# Patient Record
Sex: MALE | Race: WHITE | Employment: STUDENT | URBAN - METROPOLITAN AREA
[De-identification: names, ages, dates, MRNs, and addresses within clinical notes are randomized per-mention and may not be internally consistent; named-entity substitution may affect disease eponyms.]

---

## 2017-01-25 ENCOUNTER — OFFICE VISIT (OUTPATIENT)
Dept: INTERNAL MEDICINE CLINIC | Facility: CLINIC | Age: 22
End: 2017-01-25

## 2017-01-25 ENCOUNTER — APPOINTMENT (OUTPATIENT)
Dept: LAB | Age: 22
End: 2017-01-25
Attending: NURSE PRACTITIONER
Payer: COMMERCIAL

## 2017-01-25 VITALS
HEART RATE: 76 BPM | SYSTOLIC BLOOD PRESSURE: 120 MMHG | BODY MASS INDEX: 22.64 KG/M2 | WEIGHT: 169 LBS | DIASTOLIC BLOOD PRESSURE: 62 MMHG | TEMPERATURE: 99 F | HEIGHT: 72.5 IN

## 2017-01-25 DIAGNOSIS — Z11.3 SCREEN FOR STD (SEXUALLY TRANSMITTED DISEASE): ICD-10-CM

## 2017-01-25 DIAGNOSIS — Z00.00 ROUTINE GENERAL MEDICAL EXAMINATION AT A HEALTH CARE FACILITY: ICD-10-CM

## 2017-01-25 DIAGNOSIS — Z00.00 ROUTINE GENERAL MEDICAL EXAMINATION AT A HEALTH CARE FACILITY: Primary | ICD-10-CM

## 2017-01-25 DIAGNOSIS — Z13.220 SCREENING FOR LIPID DISORDERS: ICD-10-CM

## 2017-01-25 DIAGNOSIS — Z13.29 SCREENING FOR ENDOCRINE, METABOLIC AND IMMUNITY DISORDER: ICD-10-CM

## 2017-01-25 DIAGNOSIS — Z13.0 SCREENING FOR ENDOCRINE, METABOLIC AND IMMUNITY DISORDER: ICD-10-CM

## 2017-01-25 DIAGNOSIS — Z13.228 SCREENING FOR ENDOCRINE, METABOLIC AND IMMUNITY DISORDER: ICD-10-CM

## 2017-01-25 DIAGNOSIS — Z00.00 LABORATORY EXAMINATION ORDERED AS PART OF A COMPLETE PHYSICAL EXAMINATION: ICD-10-CM

## 2017-01-25 DIAGNOSIS — Z13.29 SCREENING FOR THYROID DISORDER: ICD-10-CM

## 2017-01-25 LAB
ALBUMIN SERPL-MCNC: 4.1 G/DL (ref 3.5–4.8)
ALP LIVER SERPL-CCNC: 67 U/L (ref 45–117)
ALT SERPL-CCNC: 31 U/L (ref 17–63)
AST SERPL-CCNC: 17 U/L (ref 15–41)
BILIRUB SERPL-MCNC: 0.3 MG/DL (ref 0.1–2)
BUN BLD-MCNC: 16 MG/DL (ref 8–20)
CALCIUM BLD-MCNC: 9.2 MG/DL (ref 8.3–10.3)
CHLORIDE: 106 MMOL/L (ref 101–111)
CHOLEST SMN-MCNC: 117 MG/DL (ref ?–190)
CO2: 29 MMOL/L (ref 22–32)
CREAT BLD-MCNC: 0.85 MG/DL (ref 0.7–1.3)
GLUCOSE BLD-MCNC: 87 MG/DL (ref 70–99)
HDLC SERPL-MCNC: 72 MG/DL (ref 45–?)
HDLC SERPL: 1.63 {RATIO} (ref ?–4.97)
LDLC SERPL CALC-MCNC: 35 MG/DL (ref ?–120)
M PROTEIN MFR SERPL ELPH: 7.4 G/DL (ref 6.1–8.3)
NONHDLC SERPL-MCNC: 45 MG/DL (ref ?–150)
POTASSIUM SERPL-SCNC: 4.2 MMOL/L (ref 3.6–5.1)
SODIUM SERPL-SCNC: 141 MMOL/L (ref 136–144)
TRIGLYCERIDES: 51 MG/DL (ref ?–115)
TSI SER-ACNC: 1.34 MIU/ML (ref 0.35–5.5)
VLDL: 10 MG/DL (ref 5–40)

## 2017-01-25 PROCEDURE — 84443 ASSAY THYROID STIM HORMONE: CPT

## 2017-01-25 PROCEDURE — 90715 TDAP VACCINE 7 YRS/> IM: CPT | Performed by: NURSE PRACTITIONER

## 2017-01-25 PROCEDURE — 80061 LIPID PANEL: CPT

## 2017-01-25 PROCEDURE — 99385 PREV VISIT NEW AGE 18-39: CPT | Performed by: NURSE PRACTITIONER

## 2017-01-25 PROCEDURE — 80053 COMPREHEN METABOLIC PANEL: CPT

## 2017-01-25 PROCEDURE — 90471 IMMUNIZATION ADMIN: CPT | Performed by: NURSE PRACTITIONER

## 2017-01-25 NOTE — PROGRESS NOTES
Laureano Chew is a 24year old male who presents for a complete physical exam.     HPI:   Pt complains of  None. 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 Migraine history. Has been quite some time since he had a migraine.   Treated with imitrex i 03/09/1995 05/08/1995 07/25/1995 02/07/2000      MMR                   04/16/1996 08/31/1999      Meningococcal, Conjugate                          04/26/2008 08/03/2013      TDAP                  04/26/2008 01/25/2017    D diagnosis)  Laboratory examination ordered as part of a complete physical examination  Screen for std (sexually transmitted disease)      Orders Placed This Encounter  TSH  COMP METABOLIC PANEL  LIPID PANEL  TdaP (Adacel, Boostrix) (59546) (DX V06.1/Z23)

## 2017-01-26 LAB
C TRACH DNA SPEC QL NAA+PROBE: NEGATIVE
N GONORRHOEA DNA SPEC QL NAA+PROBE: NEGATIVE

## 2024-07-18 ENCOUNTER — OFFICE VISIT (OUTPATIENT)
Dept: NEUROLOGY | Facility: CLINIC | Age: 29
End: 2024-07-18
Payer: COMMERCIAL

## 2024-07-18 VITALS
OXYGEN SATURATION: 98 % | DIASTOLIC BLOOD PRESSURE: 68 MMHG | BODY MASS INDEX: 25 KG/M2 | HEART RATE: 68 BPM | RESPIRATION RATE: 16 BRPM | WEIGHT: 190.19 LBS | SYSTOLIC BLOOD PRESSURE: 122 MMHG

## 2024-07-18 DIAGNOSIS — R41.89 BRAIN FOG: ICD-10-CM

## 2024-07-18 DIAGNOSIS — G43.109 MIGRAINE WITH AURA AND WITHOUT STATUS MIGRAINOSUS, NOT INTRACTABLE: Primary | ICD-10-CM

## 2024-07-18 PROCEDURE — 99204 OFFICE O/P NEW MOD 45 MIN: CPT | Performed by: OTHER

## 2024-07-18 NOTE — PROGRESS NOTES
Patient states he was getting migraines since the beginning of this year and noticed some trouble focusing/ concentrating for the past 1 month. Some forgetfulness also. No headache association. Last headache was 2 weeks ago but mild.

## 2024-07-18 NOTE — PROGRESS NOTES
HPI:    Patient ID: Abdoulaye Hayden is a 28 year old male.    HPI  Abdoulaye Hayden is is a 28-year-old male with history of migraines who presented for evaluation of headaches and and other new neurological symptoms.  He was seen by me about 10 years ago for migraines and at that time patient was advised to take propranolol, he was unable to tolerate so patient discontinued it. He reports migraine Improved on its own and he continue to manage it with over-the-counter medications  He reports he usually gets couple migraines  a year and gets clusters of headaches for a week.  He reports recently he had an episode while was playing a video game followed confused was having a hard time playing the game and had trouble focusing almost felt like a seizure.  Symptoms lasted for few minutes then improved  Since then has noticed aura feeling, head rush but no migraine headaches. He has been getting left side pressure headache.         HISTORY:  Past Medical History:    Migraines      History reviewed. No pertinent surgical history.   Family History   Problem Relation Age of Onset    Stroke Maternal Grandmother     Cancer Maternal Grandfather     Cancer Paternal Grandmother     Cancer Paternal Grandfather       Social History     Socioeconomic History    Marital status: Single   Tobacco Use    Smoking status: Never    Smokeless tobacco: Never   Vaping Use    Vaping status: Never Used   Substance and Sexual Activity    Alcohol use: Yes     Alcohol/week: 2.0 - 3.0 standard drinks of alcohol     Types: 2 - 3 Standard drinks or equivalent per week     Comment: socially    Drug use: No    Sexual activity: Yes   Other Topics Concern    Caffeine Concern No    Exercise No     Comment: daily        Review of Systems   Constitutional: Negative.    HENT: Negative.     Eyes: Negative.    Respiratory: Negative.     Cardiovascular: Negative.    Gastrointestinal: Negative.    All other systems reviewed and are negative.           Current  Outpatient Medications   Medication Sig Dispense Refill    SUMAtriptan Succinate (IMITREX) 50 MG Oral Tab Take at onset of headache.  Repeat the dose in 2 hrs if no or partial relief. (Patient not taking: Reported on 7/18/2024) 10 tablet 2     Allergies:No Known Allergies  PHYSICAL EXAM:   Physical Exam  Blood pressure 122/68, pulse 68, resp. rate 16, weight 190 lb 3.2 oz (86.3 kg), SpO2 98%.  General Appearance: Well nourished, well developed, no apparent distress.     HEENT: Normocephalic and atraumatic. Normal sclera. Moist mucus membrane  Neck: Normal range of motion. Neck supple.  Cardiovascular: Normal rate, regular rhythm and normal heart sounds.    Pulmonary/Chest: Effort normal and breath sounds normal.   Abdominal: Soft. Bowel sounds are normal.   Skin: dry, clean and intact  Ext: peripheral pulses present  Psych: normal mood and affect    Neurological:  Patient is awake, alert and oriented to person, place and time   Normal memory, attention/concentration, speech and language.    Cranial Nerves:   II: Visual acuity: normal with glasses  II: Visual fields: normal  III: Pupils: equal, round, reactive to light  III,IV,VI: Extra Ocular Movements: intact  V: Facial sensation: intact  VII: Facial strength: intact  VIII: Hearing: intact  IX: Palate: intact  XI: Shoulder shrug: intact  XII: Tongue movement: normal    Motor: Normal tone. Strength is  5 out of 5 in all extremities bilaterally.  DTR: present    Sensory: Sensory examination is normal to light touch and pinprick     Coordination: Finger-to-nose, heel-to-shin, and rapid alternating movements are normal bilaterally without evidence of dysmetria.    Gait: Casual, toe, heel, and tandem gait are normal.          TESTS/IMAGING:     none    ASSESSMENT/PLAN:       ICD-10-CM    1. Migraine with aura and without status migrainosus, not intractable  G43.109 MRI BRAIN (W+WO) (CPT=70553)     EEG      2. Brain fog  R41.89 MRI BRAIN (W+WO) (CPT=70553)     EEG         Patient is a 28-year-old male with history for migraines presenting with episodes of aura symptoms and brain fog along with pressure headaches  Probable migraine variant    Plan  We recommend MRI of the brain with and without contrast and EEG for further evaluation  Maintain log of episode/spells and note triggers  Continue conservative management for migraines    Follow up after the above is completed    Thank you for allowing us to participate in your patient's care.        Jacob Brice MD  [facility] [place]        Meds This Visit:  Requested Prescriptions      No prescriptions requested or ordered in this encounter       Imaging & Referrals:  None     ID#[id number]

## 2024-07-18 NOTE — PATIENT INSTRUCTIONS
After your visit at the [place] office  today, please direct any follow up questions or medication needs to the staff in our [place] office so that your concerns may be promptly addressed.  We are available through [employer] or at the numbers below:    The phone number is:   [phone], option 1    The fax number is:  [phone]    Your pharmacy should also send any requests electronically to the [place] office.       Refill policies:    Allow 2-3 business days for refills; controlled substances may take longer.  Contact your pharmacy at least 5 days prior to running out of medication and have them send an electronic request or submit request through the “request refill” option in your [employer] account.  Refills are not addressed on weekends; covering physicians do not authorize routine medications on weekends.  No narcotics or controlled substances are refilled after noon on Fridays or by on call physicians.  By law, narcotics must be electronically prescribed.  A 30 day supply with no refills is the maximum allowed.  If your prescription is due for a refill, you may be due for a follow up appointment.  To best provide you care, patients receiving routine medications need to be seen at least once a year.  Patients receiving narcotic/controlled substance medications need to be seen at least once every 3 months.  In the event that your preferred pharmacy does not have the requested medication in stock (e.g. Backordered), it is your responsibility to find another pharmacy that has the requested medication available.  We will gladly send a new prescription to that pharmacy at your request.    Scheduling Tests:    If your physician has ordered radiology tests such as MRI or CT scans, please contact Central Scheduling at [phone] right away to schedule the test.  Once scheduled, the [facility] Centralized Referral Team will work with your insurance carrier to obtain pre-certification or prior authorization.   Depending on your insurance carrier, approval may take 3-10 days.  It is highly recommended patients assure they have received an authorization before having a test performed.  If test is done without insurance authorization, patient may be responsible for the entire amount billed.      Precertification and Prior Authorizations:  If your physician has recommended that you have a procedure or additional testing performed the [facility] Centralized Referral Team will contact your insurance carrier to obtain pre-certification or prior authorization.    You are strongly encouraged to contact your insurance carrier to verify that your procedure/test has been approved and is a COVERED benefit.  Although the [facility] Centralized Referral Team does its due diligence, the insurance carrier gives the disclaimer that \"Although the procedure is authorized, this does not guarantee payment.\"    Ultimately the patient is responsible for payment.   Thank you for your understanding in this matter.  Paperwork Completion:  If you require FMLA or disability paperwork for your recovery, please make sure to either drop it off or have it faxed to our office at [phone]. Be sure the form has your name and date of birth on it.  The form will be faxed to our Forms Department and they will complete it for you.  There is a 25$ fee for all forms that need to be filled out.  Please be aware there is a 10-14 day turnaround time.  You will need to sign a release of information (SENTHIL) form if your paperwork does not come with one.  You may call the Forms Department with any questions at [phone].  Their fax number is [phone].

## 2024-07-31 ENCOUNTER — NURSE ONLY (OUTPATIENT)
Dept: ELECTROPHYSIOLOGY | Facility: HOSPITAL | Age: 29
End: 2024-07-31
Attending: Other
Payer: COMMERCIAL

## 2024-07-31 DIAGNOSIS — G43.109 MIGRAINE WITH AURA AND WITHOUT STATUS MIGRAINOSUS, NOT INTRACTABLE: ICD-10-CM

## 2024-07-31 DIAGNOSIS — R41.89 BRAIN FOG: ICD-10-CM

## 2024-07-31 PROCEDURE — 95819 EEG AWAKE AND ASLEEP: CPT

## 2024-08-01 ENCOUNTER — TELEPHONE (OUTPATIENT)
Dept: NEUROLOGY | Facility: CLINIC | Age: 29
End: 2024-08-01

## 2024-08-05 ENCOUNTER — TELEPHONE (OUTPATIENT)
Dept: NEUROLOGY | Facility: CLINIC | Age: 29
End: 2024-08-05

## 2024-08-05 NOTE — TELEPHONE ENCOUNTER
Patient was called and we discussed EEG results.  The patient has had aura-like symptoms but denies convulsive activity or focal seizures    Explained to the patient the EEG findings showing some susceptibility for seizures but given that clinically he is having aura like symptoms without full blown seizure we can monitor closely and consider medication if necessary  However if he gets frequent aura or episode of LOC then will start antiseizure medication  Advise caution with driving and adequate good night sleep    MRI brain w and wo contrast pending.  Will touch base after the MRI is completed  Patient indicates understanding and is agreeable with the plan

## 2024-08-15 ENCOUNTER — HOSPITAL ENCOUNTER (OUTPATIENT)
Dept: MRI IMAGING | Facility: HOSPITAL | Age: 29
Discharge: HOME | End: 2024-08-15
Attending: Other
Payer: COMMERCIAL

## 2024-08-15 DIAGNOSIS — G43.109 MIGRAINE WITH AURA AND WITHOUT STATUS MIGRAINOSUS, NOT INTRACTABLE: ICD-10-CM

## 2024-08-15 DIAGNOSIS — R41.89 BRAIN FOG: ICD-10-CM

## 2024-08-15 PROCEDURE — 70553 MRI BRAIN STEM W/O & W/DYE: CPT | Performed by: OTHER

## 2024-08-15 PROCEDURE — A9575 INJ GADOTERATE MEGLUMI 0.1ML: HCPCS | Performed by: OTHER

## 2024-08-15 RX ORDER — GADOTERATE MEGLUMINE 376.9 MG/ML
17 INJECTION INTRAVENOUS
Status: COMPLETED | OUTPATIENT
Start: 2024-08-15 | End: 2024-08-15

## 2024-08-15 RX ADMIN — GADOTERATE MEGLUMINE 17 ML: 376.9 INJECTION INTRAVENOUS at 11:15:00

## 2024-08-19 ENCOUNTER — TELEPHONE (OUTPATIENT)
Dept: NEUROLOGY | Facility: CLINIC | Age: 29
End: 2024-08-19

## 2024-08-19 NOTE — TELEPHONE ENCOUNTER
[employer] message sent to patient notifying patient no acute abnormality seen on MRI

## 2024-09-05 ENCOUNTER — OFFICE VISIT (OUTPATIENT)
Dept: NEUROLOGY | Facility: CLINIC | Age: 29
End: 2024-09-05
Payer: COMMERCIAL

## 2024-09-05 VITALS
HEART RATE: 74 BPM | RESPIRATION RATE: 16 BRPM | WEIGHT: 194.19 LBS | DIASTOLIC BLOOD PRESSURE: 60 MMHG | BODY MASS INDEX: 26 KG/M2 | OXYGEN SATURATION: 99 % | SYSTOLIC BLOOD PRESSURE: 120 MMHG

## 2024-09-05 DIAGNOSIS — G43.109 MIGRAINE WITH AURA AND WITHOUT STATUS MIGRAINOSUS, NOT INTRACTABLE: ICD-10-CM

## 2024-09-05 DIAGNOSIS — R41.89 BRAIN FOG: ICD-10-CM

## 2024-09-05 DIAGNOSIS — R94.01 ABNORMAL EEG: ICD-10-CM

## 2024-09-05 PROCEDURE — 99214 OFFICE O/P EST MOD 30 MIN: CPT | Performed by: OTHER

## 2024-09-05 NOTE — PROGRESS NOTES
HPI:    Patient ID: Abdoulaye Hayden is a 28 year old male.    Migraine       Patient is a 28 year old male who presents for follow up and to discuss test results.  He is here along with his wife. Abdoulaye reports he hasn't had any significant aura symptoms but intermittently continues to have weird sensation head pressure on the left side. Wife reports no noticeable seizure like activity in sleep.  EEG obtained apparently showed bifrontal central sharps mostly in drowsy -sleep state  MRI brain w and wo contrast was negative        Abdoulaye Hayden is is a 28-year-old male with history of migraines who presented for evaluation of headaches and and other new neurological symptoms.  He was seen by me about 10 years ago for migraines and at that time patient was advised to take propranolol, he was unable to tolerate so patient discontinued it. He reports migraine Improved on its own and he continue to manage it with over-the-counter medications  He reports he usually gets couple migraines  a year and gets clusters of headaches for a week.  He reports recently he had an episode while was playing a video game followed confused was having a hard time playing the game and had trouble focusing almost felt like a seizure.  Symptoms lasted for few minutes then improved  Since then has noticed aura feeling, head rush but no migraine headaches. He has been getting left side pressure headache.         HISTORY:  Past Medical History:    Migraines      History reviewed. No pertinent surgical history.   Family History   Problem Relation Age of Onset    Stroke Maternal Grandmother     Cancer Maternal Grandfather     Cancer Paternal Grandmother     Cancer Paternal Grandfather       Social History     Socioeconomic History    Marital status:    Tobacco Use    Smoking status: Never    Smokeless tobacco: Never   Vaping Use    Vaping status: Never Used   Substance and Sexual Activity    Alcohol use: Yes     Alcohol/week: 2.0 - 3.0 standard  drinks of alcohol     Types: 2 - 3 Standard drinks or equivalent per week     Comment: socially    Drug use: No    Sexual activity: Yes   Other Topics Concern    Caffeine Concern No    Exercise No     Comment: daily        Review of Systems   Constitutional: Negative.    HENT: Negative.     Eyes: Negative.    Respiratory: Negative.     Cardiovascular: Negative.    Gastrointestinal: Negative.    All other systems reviewed and are negative.           No current outpatient medications on file.     Allergies:No Known Allergies  PHYSICAL EXAM:   Physical Exam  Blood pressure 120/60, pulse 74, resp. rate 16, weight 194 lb 3.2 oz (88.1 kg), SpO2 99%.  General Appearance: Well nourished, well developed, no apparent distress.     HEENT: Normocephalic and atraumatic. Normal sclera. Moist mucus membrane  Neck: Normal range of motion. Neck supple.  Cardiovascular: Normal rate, regular rhythm and normal heart sounds.    Pulmonary/Chest: Effort normal and breath sounds normal.   Abdominal: Soft. Bowel sounds are normal.   Skin: dry, clean and intact  Ext: peripheral pulses present  Psych: normal mood and affect    Neurological:  Patient is awake, alert and oriented to person, place and time   Normal memory, attention/concentration, speech and language.    Cranial Nerves:   II: Visual acuity: normal with glasses  II: Visual fields: normal  III: Pupils: equal, round, reactive to light  III,IV,VI: Extra Ocular Movements: intact  V: Facial sensation: intact  VII: Facial strength: intact  VIII: Hearing: intact  IX: Palate: intact  XI: Shoulder shrug: intact  XII: Tongue movement: normal    Motor: Normal tone. Strength is  5 out of 5 in all extremities bilaterally.  DTR: present    Sensory: Sensory examination is normal to light touch and pinprick     Coordination: Finger-to-nose, heel-to-shin, and rapid alternating movements are normal bilaterally without evidence of dysmetria.    Gait: normal casual gait          TESTS/IMAGING:      MRI brain w and wo contrast; August 2024  FINDINGS:  The ventricles and sulci are within normal limits.  There is no midline shift or mass effect.  The basal cisterns are patent.  The gray-white matter differentiation is intact.  The craniocervical junction is unremarkable.       There is no acute intracranial hemorrhage or extra-axial fluid collection identified.  There is no parenchymal signal abnormality identified.  No significant abnormal parenchymal gradient susceptibility.  There is no abnormal parenchymal or  leptomeningeal enhancement.  There is no restricted diffusion to suggest acute ischemia/infarction.     Scattered minimal mucosal thickening in the paranasal sinuses.  The mastoid air cells are unremarkable.  The expected major intracranial flow voids are present.                      Impression   CONCLUSION:  No acute intracranial abnormality identified.          ASSESSMENT/PLAN:       ICD-10-CM    1. Migraine with aura and without status migrainosus, not intractable  G43.109       2. Brain fog  R41.89       3. Abnormal EEG  R94.01             Patient is a 28-year-old male with history for migraines presenting with episodes of aura symptoms and brain fog along with pressure headaches  Probable migraine variant vs seizure aura    EEG obtained showed bifrontal central discharges  MRI brain negative    Discussed with patient and his wife the test results and plan of care  Given there is no clinical seizure activity but possible aura symptoms no indication for antiseizure medications. Will consider Topamax or Depakote if aura or migraines worsen.  He indicates understanding    Maintain log of episode/spells, adequate sleep and avoid video games  Continue conservative management for migraines        Jacob Brice MD  [facility] [place]      This note was prepared using Dragon Medical voice recognition dictation software. As a result errors may occur. When identified these errors have been  corrected. While every attempt is made to correct errors during dictation discrepancies may still exist       Meds This Visit:  Requested Prescriptions      No prescriptions requested or ordered in this encounter       Imaging & Referrals:  None     ID#[id number]

## 2024-09-05 NOTE — PROGRESS NOTES
Patient reports no migraines/headaches in the past month. Last headache Jan/February.  Gets cluster headaches ( pain is always above right eyebrow and behind right eye).

## 2024-09-05 NOTE — PATIENT INSTRUCTIONS
After your visit at the [place] office  today, please direct any follow up questions or medication needs to the staff in our [place] office so that your concerns may be promptly addressed.  We are available through [employer] or at the numbers below:    The phone number is:   [phone], option 1    The fax number is:  [phone]    Your pharmacy should also send any requests electronically to the [place] office.       Refill policies:    Allow 2-3 business days for refills; controlled substances may take longer.  Contact your pharmacy at least 5 days prior to running out of medication and have them send an electronic request or submit request through the “request refill” option in your [employer] account.  Refills are not addressed on weekends; covering physicians do not authorize routine medications on weekends.  No narcotics or controlled substances are refilled after noon on Fridays or by on call physicians.  By law, narcotics must be electronically prescribed.  A 30 day supply with no refills is the maximum allowed.  If your prescription is due for a refill, you may be due for a follow up appointment.  To best provide you care, patients receiving routine medications need to be seen at least once a year.  Patients receiving narcotic/controlled substance medications need to be seen at least once every 3 months.  In the event that your preferred pharmacy does not have the requested medication in stock (e.g. Backordered), it is your responsibility to find another pharmacy that has the requested medication available.  We will gladly send a new prescription to that pharmacy at your request.    Scheduling Tests:    If your physician has ordered radiology tests such as MRI or CT scans, please contact Central Scheduling at [phone] right away to schedule the test.  Once scheduled, the [facility] Centralized Referral Team will work with your insurance carrier to obtain pre-certification or prior authorization.   Depending on your insurance carrier, approval may take 3-10 days.  It is highly recommended patients assure they have received an authorization before having a test performed.  If test is done without insurance authorization, patient may be responsible for the entire amount billed.      Precertification and Prior Authorizations:  If your physician has recommended that you have a procedure or additional testing performed the [facility] Centralized Referral Team will contact your insurance carrier to obtain pre-certification or prior authorization.    You are strongly encouraged to contact your insurance carrier to verify that your procedure/test has been approved and is a COVERED benefit.  Although the [facility] Centralized Referral Team does its due diligence, the insurance carrier gives the disclaimer that \"Although the procedure is authorized, this does not guarantee payment.\"    Ultimately the patient is responsible for payment.   Thank you for your understanding in this matter.  Paperwork Completion:  If you require FMLA or disability paperwork for your recovery, please make sure to either drop it off or have it faxed to our office at [phone]. Be sure the form has your name and date of birth on it.  The form will be faxed to our Forms Department and they will complete it for you.  There is a 25$ fee for all forms that need to be filled out.  Please be aware there is a 10-14 day turnaround time.  You will need to sign a release of information (SENTHIL) form if your paperwork does not come with one.  You may call the Forms Department with any questions at [phone].  Their fax number is [phone].

## (undated) NOTE — MR AVS SNAPSHOT
[facility] [address] [phone]               Thank you for choosing us for your health care visit with BEN Gilbert.   We are glad to serve you and happy to provide you with this summar Assoc Dx:  Screen for STD (sexually transmitted disease) [Z11.3]           Comp Metabolic Panel (14) [E]    Complete by:  Jan 25, 2017 (Approximate)    Assoc Dx:  Routine general medical examination at a health care facility [Z00.00], Screening for endocr